# Patient Record
Sex: MALE | Race: WHITE | Employment: OTHER | URBAN - METROPOLITAN AREA
[De-identification: names, ages, dates, MRNs, and addresses within clinical notes are randomized per-mention and may not be internally consistent; named-entity substitution may affect disease eponyms.]

---

## 2017-07-17 ENCOUNTER — HOSPITAL ENCOUNTER (OUTPATIENT)
Dept: VASCULAR SURGERY | Age: 62
Discharge: HOME | End: 2017-07-17
Attending: ORTHOPAEDIC SURGERY
Payer: COMMERCIAL

## 2017-07-17 DIAGNOSIS — M75.121 COMPLETE TEAR OF RIGHT ROTATOR CUFF: ICD-10-CM

## 2017-07-17 DIAGNOSIS — M75.41 IMPINGEMENT SYNDROME OF RIGHT SHOULDER: ICD-10-CM

## 2017-07-17 PROCEDURE — 93971 EXTREMITY STUDY: CPT

## 2017-07-17 NOTE — PROCEDURES
[facility]  *** FINAL REPORT ***    Name: Jillian Shah  MRN: [id number]    Outpatient  : 27 Oct 1955  HIS Order #: [id number]  [id number] [facility] Visit #: [id number]  Date: 2017    TYPE OF TEST: Peripheral Venous Testing    REASON FOR TEST  Pain in limb, Limb swelling, S/P Shoulder surgery 2017    Right Arm:-  Deep venous thrombosis:           No  Superficial venous thrombosis:    No      INTERPRETATION/FINDINGS  PROCEDURE: RIGHT UPPER EXTREMITY VENOUS DUPLEX: Evaluation of upper  extremity veins with ultrasound (B-mode imaging, pulsed Doppler, color   Doppler). Includes the internal jugular, subclavian, axillary,  brachial, radial, ulnar, basilic, and cephalic veins. FINDINGS: Lattie Halsted scale and color flow duplex images of the veins of the  right upper extremity and bilateral subclavian veins demonstrate  normal compressibility, absence of filing defects, reflux or phlebitic   changes of the internal jugular, bilateral subclavian, axillary,  upper arm and forearm veins of the right arm. CONCLUSION: Normal Right upper extremity venous duplex. No deep vein  thrombosis or thrombophlebitis. No evidence of thrombus in  contralateral left subclavian vein. ADDITIONAL COMMENTS    I have personally reviewed the data relevant to the interpretation of  this  study.     TECHNOLOGIST: LAI Flores  Signed: 2017 06:25 PM    PHYSICIAN: Kelsey Kumar MD  Signed: 2017 05:54 AM